# Patient Record
(demographics unavailable — no encounter records)

---

## 2025-03-21 NOTE — HISTORY OF PRESENT ILLNESS
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  Gyn/Sexual Hx Menses: Cycles are regular, no sig dysmenorrhea Sexually active: Never Contraception: No  History of STIs: No  Mother and maternal aunt w/ BRCA+ breast caLucy Valdovinos herself is BRCA-  HCM - Pap smear: Never had one, afraid of painful Pap as she once had a vaginal exam in her teens which was painful  - Vaccines: HPV - received / Tdap - unsure

## 2025-03-21 NOTE — HISTORY OF PRESENT ILLNESS
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  Gyn/Sexual Hx Menses: Cycles are regular, no sig dysmenorrhea Sexually active: Never Contraception: No  History of STIs: No  Mother and maternal aunt w/ BRCA+ breast caLucy Valdovinos herself is BRCA-  HCM - Pap smear: Never had one, afraid of painful Pap as she once had a vaginal exam in her teens which was painful  - Vaccines: HPV - received / Tdap - unsure

## 2025-03-21 NOTE — HEALTH RISK ASSESSMENT
[Yes] : Yes [2 - 4 times a month (2 pts)] : 2-4 times a month (2 points) [1 or 2 (0 pts)] : 1 or 2 (0 points) [Never (0 pts)] : Never (0 points) [No] : In the past 12 months have you used drugs other than those required for medical reasons? No [Little interest or pleasure doing things] : 1) Little interest or pleasure doing things [Feeling down, depressed, or hopeless] : 2) Feeling down, depressed, or hopeless [1] : 2) Feeling down, depressed, or hopeless for several days (1) [PHQ-2 Negative - No further assessment needed] : PHQ-2 Negative - No further assessment needed [Never] : Never [de-identified] : 2 drinks a week  [Audit-CScore] : 2 [de-identified] : Likes to walk, dance, goes to the gym 1x a week for biking [de-identified] : Pescatarian, kosher, does not eat dairy [[id number]] : 2

## 2025-03-21 NOTE — ASSESSMENT
[FreeTextEntry1] : #Scalp psoriasis - F/u w/ derm, gets topicals  #IBS-D - chronic, stable - Managed through diet  #Bruxism - Uses mouth guard, has done facial PT  #Chronic anxiety #Chronic insomnia - Start therapy, she'd like to try somatic therapy  #H/o anaphylaxis - Carry Epipen at all times  #Mother and maternal aunt w/ BRCA+ breast ca. - [facility] clinic introduced to her, she will f/u w/ me about it  #HCM - Pap smear: Never had one >>> refer to Dr. Uribe - Vaccines: HPV - received / Tdap - unsure >>> can get vaccines here on Tue/Thu if interested, counseling provided - BP/BMI wnl - Labs as ordered

## 2025-03-21 NOTE — HEALTH RISK ASSESSMENT
[Yes] : Yes [2 - 4 times a month (2 pts)] : 2-4 times a month (2 points) [1 or 2 (0 pts)] : 1 or 2 (0 points) [Never (0 pts)] : Never (0 points) [No] : In the past 12 months have you used drugs other than those required for medical reasons? No [Little interest or pleasure doing things] : 1) Little interest or pleasure doing things [Feeling down, depressed, or hopeless] : 2) Feeling down, depressed, or hopeless [1] : 2) Feeling down, depressed, or hopeless for several days (1) [PHQ-2 Negative - No further assessment needed] : PHQ-2 Negative - No further assessment needed [Never] : Never [de-identified] : 2 drinks a week  [Audit-CScore] : 2 [de-identified] : Likes to walk, dance, goes to the gym 1x a week for biking [de-identified] : Pescatarian, kosher, does not eat dairy [[id number]] : 2

## 2025-03-21 NOTE — PHYSICAL EXAM
[No Acute Distress] : no acute distress [Well Nourished] : well nourished [Well Developed] : well developed [Well-Appearing] : well-appearing [Normal Voice/Communication] : normal voice/communication [Normal Sclera/Conjunctiva] : normal sclera/conjunctiva [EOMI] : extraocular movements intact [Normal Outer Ear/Nose] : the outer ears and nose were normal in appearance [Supple] : supple [Thyroid Normal, No Nodules] : the thyroid was normal and there were no nodules present [No Respiratory Distress] : no respiratory distress  [No Accessory Muscle Use] : no accessory muscle use [Clear to Auscultation] : lungs were clear to auscultation bilaterally [Normal Rate] : normal rate  [Regular Rhythm] : with a regular rhythm [Normal S1, S2] : normal S1 and S2 [No Murmur] : no murmur heard [Soft] : abdomen soft [Non Tender] : non-tender [Non-distended] : non-distended [No Masses] : no abdominal mass palpated [No HSM] : no HSM [No Joint Swelling] : no joint swelling [No Rash] : no rash [Normal Gait] : normal gait [Speech Grossly Normal] : speech grossly normal [Memory Grossly Normal] : memory grossly normal [Normal Affect] : the affect was normal [Alert and Oriented x3] : oriented to person, place, and time [Normal Mood] : the mood was normal [Normal Insight/Judgement] : insight and judgment were intact

## 2025-03-21 NOTE — REVIEW OF SYSTEMS
[Diarrhea] : diarrhea [Fever] : no fever [Chills] : no chills [Chest Pain] : no chest pain [Shortness Of Breath] : no shortness of breath [Abdominal Pain] : no abdominal pain [Constipation] : no constipation [Itching] : no itching [Hair Changes] : no hair changes [Skin Rash] : skin rash [Headache] : no headache [Dizziness] : no dizziness [Suicidal] : not suicidal [Insomnia] : insomnia [Anxiety] : anxiety [Depression] : no depression [Easy Bleeding] : no easy bleeding [Easy Bruising] : no easy bruising [Swollen Glands] : no swollen glands [FreeTextEntry7] : Diet-controlled IBS-D